# Patient Record
Sex: FEMALE | Race: WHITE
[De-identification: names, ages, dates, MRNs, and addresses within clinical notes are randomized per-mention and may not be internally consistent; named-entity substitution may affect disease eponyms.]

---

## 2018-04-12 ENCOUNTER — HOSPITAL ENCOUNTER (OUTPATIENT)
Dept: HOSPITAL 56 - MW.SDS | Age: 28
Discharge: HOME | End: 2018-04-12
Attending: OTOLARYNGOLOGY
Payer: COMMERCIAL

## 2018-04-12 DIAGNOSIS — E66.9: ICD-10-CM

## 2018-04-12 DIAGNOSIS — J34.89: ICD-10-CM

## 2018-04-12 DIAGNOSIS — J32.2: ICD-10-CM

## 2018-04-12 DIAGNOSIS — J32.0: ICD-10-CM

## 2018-04-12 DIAGNOSIS — Z79.899: ICD-10-CM

## 2018-04-12 DIAGNOSIS — J33.8: Primary | ICD-10-CM

## 2018-04-12 PROCEDURE — 87070 CULTURE OTHR SPECIMN AEROBIC: CPT

## 2018-04-12 PROCEDURE — 81025 URINE PREGNANCY TEST: CPT

## 2018-04-12 PROCEDURE — 31254 NSL/SINS NDSC W/PRTL ETHMDCT: CPT

## 2018-04-12 PROCEDURE — 31267 ENDOSCOPY MAXILLARY SINUS: CPT

## 2018-04-12 PROCEDURE — 87075 CULTR BACTERIA EXCEPT BLOOD: CPT

## 2018-04-12 PROCEDURE — 87205 SMEAR GRAM STAIN: CPT

## 2018-04-12 NOTE — [id number]
Post Anesthesia Note





- EVALUATION WITHIN 48HRS OF ANESTHETIC


Vital Signs in Normal Range: Yes


Patient Participated in Evaluation: Yes


Respiratory Function Stable: Yes


Airway Patent: Yes


Cardiovascular Function Stable: Yes


Hydration Status Stable: Yes


Pain Control Satisfactory: Yes


Nausea and Vomiting Control Satisfactory: Yes


Mental Status Recovered: Yes


Resp Rate: 17

## 2018-04-12 NOTE — PCM.PREANE
Preanesthetic Assessment





- Anesthesia/Transfusion/Family Hx


Anesthesia History: Prior Anesthesia Without Reaction (get for her c section)


Transfusion History: No Prior Transfusion(s)





- Review of Systems


General: No Symptoms


Pulmonary: No Symptoms


Cardiovascular: No Symptoms


Gastrointestinal: No Symptoms


Neurological: No Symptoms


Other: Reports: None





- Physical Assessment


NPO Status Date: 18


NPO Status Time: 23:00


O2 Sat by Pulse Oximetry: 95


Respiratory Rate: 16


Vital Signs: 





 Last Vital Signs











Temp  36.7 C   18 07:00


 


Pulse  80   18 07:00


 


Resp  16   18 07:00


 


BP  123/77   18 07:00


 


Pulse Ox  95   18 07:00











Height: 1.63 m


Weight: 92.079 kg


ASA Class: 1


Mental Status: Alert & Oriented x3


Airway Class: Mallampati = 1


Dentition: Reports: Normal Dentition


ROM/Head Extension: Full


Lungs: Clear to Auscultation, Normal Respiratory Effort


Cardiovascular: Regular Rate, Regular Rhythm





- Lab


Values: 





 Laboratory Last Values











Urine HCG, Qual  NEGATIVE  (NEGATIVE)   18  07:15    














- Allergies


Allergies/Adverse Reactions: 


 Allergies











Allergy/AdvReac Type Severity Reaction Status Date / Time


 


No Known Allergies Allergy   Verified 18 11:32














- Anesthesia Plan


Pre-Op Medication Ordered: None





- Acknowledgements


Anesthesia Type Planned: General Anesthesia


Pt an Appropriate Candidate for the Planned Anesthesia: Yes


Alternatives and Risks of Anesthesia Discussed w Pt/Guardian: Yes


Pt/Guardian Understands and Agrees with Anesthesia Plan: Yes





PreAnesthesia Questionnaire


Other HEENT History: wears glasses/contacts


OB/GYN History: Reports: Pregnancy


Psychiatric History: Reports: Depression


Endocrine/Metabolic History: Reports: Obesity/BMI 30+





- Past Surgical History


Head Surgeries/Procedures: Reports: None


Female  Surgical History: Reports:  Section





- SUBSTANCE USE


Smoking Status *Q: Never Smoker


Recreational Drug Use History: No





- HOME MEDS


Home Medications: 


 Home Meds





buPROPion HCl [Wellbutrin SR] 150 mg PO BID 18 [History]











- CURRENT (IN HOUSE) MEDS


Current Meds: 





 Current Medications





Lactated Ringer's (Ringers, Lactated)  1,000 mls @ 125 mls/hr IV ASDIRECTED [facility], [facility]


   Last Admin: 18 07:43 Dose:  125 mls/hr





Discontinued Medications





Dexamethasone (Dexamethasone) Confirm Administered Dose 20 mg .ROUTE .ST-MED 

ONE


   Stop: 18 07:01


Epinephrine HCl (Adrenalin) Confirm Administered Dose 3 mg .ROUTE .ST-MED ONE


   Stop: 18 07:29


Epinephrine HCl (Adrenalin) Confirm Administered Dose 2 mg .ROUTE .[facility]-MED ONE


   Stop: 18 07:51


Fentanyl (Sublimaze) Confirm Administered Dose 250 mcg .ROUTE .[facility]-MED ONE


   Stop: 18 06:59


Ceftriaxone Sodium 1,000 mg/ (Sodium Chloride)  50 mls @ 100 mls/hr IV ONETIME 

ONE


   Stop: 18 00:30


Lidocaine (Xylocaine-Mpf 2%) Confirm Administered Dose 5 ml .ROUTE .[facility]-MED ONE


   Stop: 18 06:59


Lidocaine HCl (Xylocaine 1%) Confirm Administered Dose 20 ml .ROUTE .[facility]-MED ONE


   Stop: 18 07:30


Lidocaine/Epinephrine (Xylocaine 2% With Epinephrine 1:100,000) Confirm 

Administered Dose 20 ml .ROUTE .[facility]-MED ONE


   Stop: 18 07:30


Midazolam HCl (Versed 1 Mg/Ml) Confirm Administered Dose 2 mg .ROUTE .STK-MED 

ONE


   Stop: 18 06:59


Ondansetron HCl (Zofran) Confirm Administered Dose 4 mg .ROUTE .ST-MED ONE


   Stop: 18 06:59


Oxymetazoline HCl (Afrin Original 0.05% Nasal Spray) Confirm Administered Dose 

15 ml .ROUTE .[facility]-MED ONE


   Stop: 18 07:29


Phenylephrine HCl (Mir-Synephrine) Confirm Administered Dose 10 mg .ROUTE .[facility]-

MED ONE


   Stop: 18 07:00


Propofol (Diprivan  20 Ml) Confirm Administered Dose 200 mg .ROUTE .ST-MED ONE


   Stop: 18 06:59


Propofol (Diprivan  20 Ml) Confirm Administered Dose 600 mg .ROUTE .ST-MED ONE


   Stop: 18 07:00


Remifentanil (Ultiva) Confirm Administered Dose 2 mg .ROUTE .ST-MED ONE


   Stop: 18 07:15


Rocuronium Bromide (Zemuron) Confirm Administered Dose 100 mg .ROUTE .STK-MED 

ONE


   Stop: 18 06:59


Thrombin (Thrombin-Jmi) Confirm Administered Dose 5,000 unit .ROUTE .STK-MED ONE


   Stop: 18 07:30


Thrombin (Thrombin-Jmi) Confirm Administered Dose 5,000 unit .ROUTE .STK-MED ONE


   Stop: 18 07:41

## 2018-04-12 NOTE — PCM.OPNOTE
- General Post-Op/Procedure Note


Condition: Good


Free Text/Narrative:: 





Pre opeartive Diagnosis: Left middle meatus and maxillary sinus polyp


Post operative diagnosis: Polypoid degeneration of Left uncinate process; 

polypoid mucosa L maxillary sinus; chronic purulent Left maxillary and ethmoid 

sinusitis


Procedure: Endoscopic Left middle meatal antrostomy with removal of polypoid 

mucosa [ CPT 93790]; Left anterior ethmoidectomy [ 42207]; exploration of Left 

frontal recess [ 62075]; navigation [ [id number]]]


Surgeon: Desirae Woods MD


Anesthesia: General


Anesthesiologist: Michael Patel CRNA


Date of procedure: 4/22/2018


Indications: Left nasal obstruction, Left maxillary sinus mass


Findings: Creamy purulence from Left middle meatus; polypoid degeneration of L 

uncinate process; purulence in L maxillary sinus ++; polypoid mucosa of 


L maxillary sinus and ethmoid bulla


Operation Details: 


The [employer] compact fusion navigation system was set up and the Tracker was 

fixed to the fore head as per protocol. Patient tracking was performed and 

successful registration was obtained. The left middle turbinate axilla and the 

head of middle turbinate were infiltrated with 2% lidocaine and 1 in 100,000 

epinephrine-a total of 3 ml was used. A cottonoid pledget soaked in 1: 10,000 

epinephrine each was placed in the middle meatus, draping over the middle 

turbinate axilla and one in the region of the sphenoethmoid recess. 


After appropriate period of decongestion the cottonoid pledgets were removed. 

The middle turbinate was gently medialized with freer elevator and the 


0 rigid nasal endoscope was introduced into the middle meatus. Findings as 

above; cultures were taken from the MM purulence; a biopsy of the polypoid 

mucosa was taken with Matthew montoya and sent to pathology. The posterior free 

edge of the uncinate process was identified and medialized with a ball probe. A 

straight shot tri-cut blade with navigation attached to it was then used to 

debride the vertical attachment of the uncinate process up to the attachment of 

Agger nasi cell. A ball probe was then used to identified the left maxillary 

sinus ostium - purulence was expressed ++ The horizontal part of the uncinate 

process was then further dissected away with a ball probe and then with a 

combination of microdebrider and cold steel dissection. The 30 rigid endoscope 

was used to examine the maxillary ostium - findings as above. The maxillary 

sinus was thoroughly irrigated with saline. A 0 degree nasal endoscope was used 

and next the natural ostium of the left bulla ethmoidalis was identified and 

down fractured with a curette. Further removal of the bulla was performed with 

a microdebrider blade and anterior ethmoid cells were removed - polypoid mucosa 

was removed from anterior ethmoid and maxillary sinus with a blakesley jan. 

The frontal recess was explored - minimal polypoid mucosa +; no purulence was 

seen.


At all times the position of instruments was confirmed with [employer] 

navigation and a combination of straight, 90 and 70 degrees suctions and ostium 

seekers - all navigated were used as required at appropriate times to assist 

with identifying landmarks and dissection. Sinuses were irrigated with warm 

saline.


Post nasal space was suctioned clear of blood bilaterally and per orally.





[place] dressing was inserted in the middle meatus 





This completed the procedure and the patient was turned over to the 

anesthesiologist for recovery.








Specimens: Purulence for GS and culture sensitivity; polypoid L uncinate and 

maxillary sinus mucosa; contents from suction trap


IV fluids: 1500 ml


Blood loss: 35 ml


Blood products: nil


Disposition: PACU for recovery


Follow up: In 1 week

## 2018-04-12 NOTE — PCM.HPR
H & P Addendum review





- H & P Addendum Review


Date of Original H & P: 04/02/18


Date Reviewed: 04/12/18


Time Reviewed: 07:55


Patient was Examined: No Changes